# Patient Record
Sex: FEMALE | Race: WHITE | Employment: OTHER | URBAN - NONMETROPOLITAN AREA
[De-identification: names, ages, dates, MRNs, and addresses within clinical notes are randomized per-mention and may not be internally consistent; named-entity substitution may affect disease eponyms.]

---

## 2019-12-22 ENCOUNTER — HOSPITAL ENCOUNTER (EMERGENCY)
Facility: HOSPITAL | Age: 84
Discharge: HOME | End: 2019-12-22
Attending: EMERGENCY MEDICINE | Admitting: EMERGENCY MEDICINE
Payer: MEDICARE

## 2019-12-22 ENCOUNTER — APPOINTMENT (OUTPATIENT)
Dept: CT IMAGING | Facility: HOSPITAL | Age: 84
End: 2019-12-22
Attending: EMERGENCY MEDICINE
Payer: MEDICARE

## 2019-12-22 VITALS
RESPIRATION RATE: 16 BRPM | OXYGEN SATURATION: 99 % | DIASTOLIC BLOOD PRESSURE: 92 MMHG | TEMPERATURE: 98.2 F | HEART RATE: 74 BPM | SYSTOLIC BLOOD PRESSURE: 141 MMHG

## 2019-12-22 DIAGNOSIS — R10.84 ABDOMINAL PAIN, GENERALIZED: Primary | ICD-10-CM

## 2019-12-22 DIAGNOSIS — K86.9 PANCREATIC LESION: ICD-10-CM

## 2019-12-22 DIAGNOSIS — K29.70 GASTRITIS WITHOUT BLEEDING, UNSPECIFIED CHRONICITY, UNSPECIFIED GASTRITIS TYPE: ICD-10-CM

## 2019-12-22 LAB
ALBUMIN SERPL-MCNC: 3.7 G/DL (ref 3.4–5)
ALP SERPL-CCNC: 53 U/L (ref 40–150)
ALT SERPL W P-5'-P-CCNC: 20 U/L (ref 0–50)
ANION GAP SERPL CALCULATED.3IONS-SCNC: 4 MMOL/L (ref 3–14)
AST SERPL W P-5'-P-CCNC: 14 U/L (ref 0–45)
BASOPHILS # BLD AUTO: 0 10E9/L (ref 0–0.2)
BASOPHILS NFR BLD AUTO: 0.5 %
BILIRUB SERPL-MCNC: 1.2 MG/DL (ref 0.2–1.3)
BUN SERPL-MCNC: 28 MG/DL (ref 7–30)
CALCIUM SERPL-MCNC: 9.4 MG/DL (ref 8.5–10.1)
CHLORIDE SERPL-SCNC: 101 MMOL/L (ref 94–109)
CO2 SERPL-SCNC: 32 MMOL/L (ref 20–32)
CREAT SERPL-MCNC: 1.09 MG/DL (ref 0.52–1.04)
DIFFERENTIAL METHOD BLD: NORMAL
EOSINOPHIL # BLD AUTO: 0.1 10E9/L (ref 0–0.7)
EOSINOPHIL NFR BLD AUTO: 2.4 %
ERYTHROCYTE [DISTWIDTH] IN BLOOD BY AUTOMATED COUNT: 12.5 % (ref 10–15)
ERYTHROCYTE [SEDIMENTATION RATE] IN BLOOD BY WESTERGREN METHOD: 11 MM/H (ref 0–30)
GFR SERPL CREATININE-BSD FRML MDRD: 46 ML/MIN/{1.73_M2}
GLUCOSE SERPL-MCNC: 114 MG/DL (ref 70–99)
HCT VFR BLD AUTO: 38.5 % (ref 35–47)
HGB BLD-MCNC: 13.2 G/DL (ref 11.7–15.7)
IMM GRANULOCYTES # BLD: 0 10E9/L (ref 0–0.4)
IMM GRANULOCYTES NFR BLD: 0.2 %
LYMPHOCYTES # BLD AUTO: 2.9 10E9/L (ref 0.8–5.3)
LYMPHOCYTES NFR BLD AUTO: 53.3 %
MCH RBC QN AUTO: 32.5 PG (ref 26.5–33)
MCHC RBC AUTO-ENTMCNC: 34.3 G/DL (ref 31.5–36.5)
MCV RBC AUTO: 95 FL (ref 78–100)
MONOCYTES # BLD AUTO: 0.7 10E9/L (ref 0–1.3)
MONOCYTES NFR BLD AUTO: 12.6 %
NEUTROPHILS # BLD AUTO: 1.7 10E9/L (ref 1.6–8.3)
NEUTROPHILS NFR BLD AUTO: 31 %
NRBC # BLD AUTO: 0 10*3/UL
NRBC BLD AUTO-RTO: 0 /100
PLATELET # BLD AUTO: 195 10E9/L (ref 150–450)
POTASSIUM SERPL-SCNC: 3.9 MMOL/L (ref 3.4–5.3)
PROT SERPL-MCNC: 7 G/DL (ref 6.8–8.8)
RBC # BLD AUTO: 4.06 10E12/L (ref 3.8–5.2)
SODIUM SERPL-SCNC: 137 MMOL/L (ref 133–144)
WBC # BLD AUTO: 5.5 10E9/L (ref 4–11)

## 2019-12-22 PROCEDURE — 85652 RBC SED RATE AUTOMATED: CPT | Performed by: EMERGENCY MEDICINE

## 2019-12-22 PROCEDURE — 80053 COMPREHEN METABOLIC PANEL: CPT | Performed by: EMERGENCY MEDICINE

## 2019-12-22 PROCEDURE — 85025 COMPLETE CBC W/AUTO DIFF WBC: CPT | Performed by: EMERGENCY MEDICINE

## 2019-12-22 PROCEDURE — 74177 CT ABD & PELVIS W/CONTRAST: CPT | Mod: TC

## 2019-12-22 PROCEDURE — 25500064 ZZH RX 255 OP 636: Performed by: EMERGENCY MEDICINE

## 2019-12-22 PROCEDURE — 99285 EMERGENCY DEPT VISIT HI MDM: CPT | Mod: 25

## 2019-12-22 PROCEDURE — 36415 COLL VENOUS BLD VENIPUNCTURE: CPT | Performed by: EMERGENCY MEDICINE

## 2019-12-22 PROCEDURE — 99285 EMERGENCY DEPT VISIT HI MDM: CPT | Mod: Z6 | Performed by: EMERGENCY MEDICINE

## 2019-12-22 RX ORDER — ENALAPRIL MALEATE 20 MG/1
TABLET ORAL
COMMUNITY
Start: 2019-10-18

## 2019-12-22 RX ORDER — CALCIUM POLYCARBOPHIL 625 MG
1250 TABLET ORAL
COMMUNITY
Start: 2019-11-26

## 2019-12-22 RX ORDER — GABAPENTIN 100 MG/1
100 CAPSULE ORAL 3 TIMES DAILY
COMMUNITY

## 2019-12-22 RX ORDER — POTASSIUM CHLORIDE 750 MG/1
TABLET, EXTENDED RELEASE ORAL
COMMUNITY
Start: 2017-11-05

## 2019-12-22 RX ORDER — OXYCODONE HYDROCHLORIDE 5 MG/1
5 TABLET ORAL
COMMUNITY
Start: 2019-06-25

## 2019-12-22 RX ORDER — ACETAMINOPHEN 325 MG/1
650 TABLET ORAL
COMMUNITY
Start: 2019-06-25

## 2019-12-22 RX ORDER — AMPICILLIN TRIHYDRATE 500 MG
1000 CAPSULE ORAL
COMMUNITY
Start: 2011-02-22

## 2019-12-22 RX ORDER — LEVOTHYROXINE SODIUM 75 UG/1
TABLET ORAL
COMMUNITY
Start: 2018-11-04

## 2019-12-22 RX ORDER — HYDROCHLOROTHIAZIDE 12.5 MG/1
CAPSULE ORAL
COMMUNITY
Start: 2019-07-13

## 2019-12-22 RX ORDER — SOTALOL HYDROCHLORIDE 80 MG/1
40 TABLET ORAL
COMMUNITY
Start: 2019-08-16

## 2019-12-22 RX ORDER — AMLODIPINE BESYLATE 5 MG/1
5 TABLET ORAL
COMMUNITY
Start: 2019-11-29

## 2019-12-22 RX ORDER — IOPAMIDOL 612 MG/ML
100 INJECTION, SOLUTION INTRAVASCULAR ONCE
Status: COMPLETED | OUTPATIENT
Start: 2019-12-22 | End: 2019-12-22

## 2019-12-22 RX ORDER — OMEGA-3 FATTY ACIDS/FISH OIL 300-1000MG
400 CAPSULE ORAL
COMMUNITY
Start: 2019-06-25

## 2019-12-22 RX ORDER — OMEPRAZOLE 40 MG/1
40 CAPSULE, DELAYED RELEASE ORAL DAILY
Qty: 30 CAPSULE | Refills: 0 | Status: SHIPPED | OUTPATIENT
Start: 2019-12-22 | End: 2020-01-21

## 2019-12-22 RX ORDER — PANTOPRAZOLE SODIUM 40 MG/1
40 TABLET, DELAYED RELEASE ORAL DAILY
COMMUNITY

## 2019-12-22 RX ADMIN — IOPAMIDOL 100 ML: 612 INJECTION, SOLUTION INTRAVENOUS at 19:08

## 2019-12-22 RX ADMIN — DIATRIZOATE MEGLUMINE AND DIATRIZOATE SODIUM 1 ML: 660; 100 SOLUTION ORAL; RECTAL at 19:08

## 2019-12-22 SDOH — HEALTH STABILITY: MENTAL HEALTH: HOW OFTEN DO YOU HAVE A DRINK CONTAINING ALCOHOL?: NEVER

## 2019-12-22 ASSESSMENT — ENCOUNTER SYMPTOMS
DIFFICULTY URINATING: 0
ABDOMINAL PAIN: 1
EYE REDNESS: 0
SHORTNESS OF BREATH: 0
ARTHRALGIAS: 0
HEADACHES: 0
FEVER: 0
NECK STIFFNESS: 0
CONFUSION: 0
COLOR CHANGE: 0

## 2019-12-22 NOTE — ED AVS SNAPSHOT
[facility]  [address] [address]  Phone:  [phone]                                    Rosita Brooke   MRN: [id number]    Department:  [facility]   Date of Visit:  12/22/2019           After Visit Summary Signature Page    I have received my discharge instructions, and my questions have been answered. I have discussed any challenges I see with this plan with the nurse or doctor.    ..........................................................................................................................................  Patient/Patient Representative Signature      ..........................................................................................................................................  Patient Representative Print Name and Relationship to Patient    ..................................................               ................................................  Date                                   Time    ..........................................................................................................................................  Reviewed by Signature/Title    ...................................................              ..............................................  Date                                               Time          22EPIC Rev 08/18

## 2019-12-22 NOTE — ED PROVIDER NOTES
"  History     Chief Complaint   Patient presents with     Abdominal Pain     HPI  Rosita Brooke is a 85 year old female who presented to the emergency department accompanied by the spouse and 2 sons.  Patient has been feeling unwell for more than 6 months.  Has been seen at the emergency department at [facility] multiple times without a definite diagnosis.  Patient describes her symptoms as generalized abdominal pain that is described described as burning sensation.  Does not radiate.  Is especially worse at night when patient is lying down and seems to be better in the course of the day.  She has had upper and lower GI scopes, CT scans of the abdomen and multiple laboratory test done without any definite diagnosis.  She takes hydrocodone and ibuprofen for pain which sometimes help but is very concerned because they want to know what exactly is causing her pain.  Review of patient's chart shows that patient had a CT scan done on November 26 which ruled out acute diverticulitis.  She has lost at least 10 pounds in the last 6 months.  Denies night sweats, diarrhea, vomiting, constipation.  The patient's 2 sons also are requesting that patient be admitted to the hospital \"so that she can figure out what is exactly wrong with her\".    Allergies:  Allergies   Allergen Reactions     Atenolol Nausea     Metoprolol      Insomnia, hallucinations        Problem List:    There are no active problems to display for this patient.       Past Medical History:    History reviewed. No pertinent past medical history.    Past Surgical History:    History reviewed. No pertinent surgical history.    Family History:    History reviewed. No pertinent family history.    Social History:  Marital Status:   [2]  Social History     Tobacco Use     Smoking status: Former Smoker     Smokeless tobacco: Never Used   Substance Use Topics     Alcohol use: Never     Frequency: Never     Drug use: Never        Medications:  "   acetaminophen (TYLENOL) 325 MG tablet  amLODIPine (NORVASC) 5 MG tablet  Calcium Polycarbophil (FIBER) 625 MG tablet  Cholecalciferol (D 1000) 25 MCG (1000 UT) CAPS  enalapril (VASOTEC) 20 MG tablet  gabapentin (NEURONTIN) 100 MG capsule  hydrochlorothiazide (MICROZIDE) 12.5 MG capsule  ibuprofen (ADVIL/MOTRIN) 200 MG capsule  levothyroxine (SYNTHROID/LEVOTHROID) 75 MCG tablet  omeprazole (PRILOSEC) 40 MG DR capsule  oxyCODONE (ROXICODONE) 5 MG tablet  pantoprazole (PROTONIX) 40 MG EC tablet  potassium chloride ER (K-TAB/KLOR-CON) 10 MEQ CR tablet  sotalol (BETAPACE) 80 MG tablet          Review of Systems   Constitutional: Negative for fever.   HENT: Negative for congestion.    Eyes: Negative for redness.   Respiratory: Negative for shortness of breath.    Cardiovascular: Negative for chest pain.   Gastrointestinal: Positive for abdominal pain.   Genitourinary: Negative for difficulty urinating.   Musculoskeletal: Negative for arthralgias and neck stiffness.   Skin: Negative for color change.   Neurological: Negative for headaches.   Psychiatric/Behavioral: Negative for confusion.   All other systems reviewed and are negative.      Physical Exam   BP: 139/70  Pulse: 65  Temp: 96.9  F (36.1  C)  Resp: 16  SpO2: 99 %      Physical Exam  Vitals signs and nursing note reviewed.   Constitutional:       General: She is not in acute distress.     Appearance: She is well-developed. She is not diaphoretic.   HENT:      Head: Normocephalic and atraumatic.      Mouth/Throat:      Pharynx: No oropharyngeal exudate.   Eyes:      General: No scleral icterus.     Pupils: Pupils are equal, round, and reactive to light.   Cardiovascular:      Rate and Rhythm: Normal rate and regular rhythm.      Heart sounds: Normal heart sounds.   Pulmonary:      Effort: No respiratory distress.      Breath sounds: Normal breath sounds.   Chest:      Chest wall: No tenderness.   Abdominal:      General: Bowel sounds are normal.      Palpations:  Abdomen is soft.      Tenderness: There is abdominal tenderness in the right lower quadrant, suprapubic area and left lower quadrant.   Musculoskeletal:         General: No tenderness.   Skin:     General: Skin is warm.      Findings: No rash.   Neurological:      Mental Status: She is alert and oriented to person, place, and time.         ED Course   Patient evaluated upon arrival in the ED and laboratory tests ordered including CT abdomen.      Procedures      Results for orders placed or performed during the hospital encounter of 12/22/19 (from the past 24 hour(s))   CBC with platelets differential   Result Value Ref Range    WBC 5.5 4.0 - 11.0 10e9/L    RBC Count 4.06 3.8 - 5.2 10e12/L    Hemoglobin 13.2 11.7 - 15.7 g/dL    Hematocrit 38.5 35.0 - 47.0 %    MCV 95 78 - 100 fl    MCH 32.5 26.5 - 33.0 pg    MCHC 34.3 31.5 - 36.5 g/dL    RDW 12.5 10.0 - 15.0 %    Platelet Count 195 150 - 450 10e9/L    Diff Method Automated Method     % Neutrophils 31.0 %    % Lymphocytes 53.3 %    % Monocytes 12.6 %    % Eosinophils 2.4 %    % Basophils 0.5 %    % Immature Granulocytes 0.2 %    Nucleated RBCs 0 0 /100    Absolute Neutrophil 1.7 1.6 - 8.3 10e9/L    Absolute Lymphocytes 2.9 0.8 - 5.3 10e9/L    Absolute Monocytes 0.7 0.0 - 1.3 10e9/L    Absolute Eosinophils 0.1 0.0 - 0.7 10e9/L    Absolute Basophils 0.0 0.0 - 0.2 10e9/L    Abs Immature Granulocytes 0.0 0 - 0.4 10e9/L    Absolute Nucleated RBC 0.0    Comprehensive metabolic panel   Result Value Ref Range    Sodium 137 133 - 144 mmol/L    Potassium 3.9 3.4 - 5.3 mmol/L    Chloride 101 94 - 109 mmol/L    Carbon Dioxide 32 20 - 32 mmol/L    Anion Gap 4 3 - 14 mmol/L    Glucose 114 (H) 70 - 99 mg/dL    Urea Nitrogen 28 7 - 30 mg/dL    Creatinine 1.09 (H) 0.52 - 1.04 mg/dL    GFR Estimate 46 (L) >60 mL/min/[1.73_m2]    GFR Estimate If Black 53 (L) >60 mL/min/[1.73_m2]    Calcium 9.4 8.5 - 10.1 mg/dL    Bilirubin Total 1.2 0.2 - 1.3 mg/dL    Albumin 3.7 3.4 - 5.0 g/dL     Protein Total 7.0 6.8 - 8.8 g/dL    Alkaline Phosphatase 53 40 - 150 U/L    ALT 20 0 - 50 U/L    AST 14 0 - 45 U/L   Erythrocyte sedimentation rate auto   Result Value Ref Range    Sed Rate 11 0 - 30 mm/h   CT Abdomen Pelvis w Contrast    Narrative    PROCEDURE:  CT ABDOMEN PELVIS W CONTRAST    HISTORY: Abd pain, acute, generalized; Generalized burning abdominal  pain that has been recurrent for several months.    TECHNIQUE:  Helical CT of the abdomen and pelvis was performed  following injection of intravenous contrast.  Ingested oral contrast  partially opacifies the bowel.      COMPARISON:  None.    MEDS/CONTRAST: Isovue 300 100 Ml    FINDINGS:      Limited images through the lung bases demonstrate dependent  atelectasis or scarring. A small sliding hiatal hernia is questioned.    Multiple hepatic cysts are present. There is an 11 mm pancreatic head  cyst like lesion. There is thickening of the left adrenal gland.  Spleen is unremarkable. Symmetric nephrograms are present without  hydronephrosis. There is no abdominal aortic aneurysm. The bowel is  normal in caliber. Prominent stool in the colon can be seen in  constipation.    No free fluid, free air or adenopathy is present.  No suspicious  osseous lesions are identified.      Impression    IMPRESSION:      Small sliding hiatal hernia.    11 mm cystic pancreatic lesion. Left adrenal adenoma. Consider  follow-up nonemergent MR abdomen.    XAVI THORNTON MD       Medications   diatrizoate meglumine-sodium (GASTROGRAFIN/GASTROVIEW) 66-10 % solution 30 mL (1 mL Oral Given 12/22/19 1908)   iopamidol (ISOVUE-300) IV solution 61% 100 mL (100 mLs Intravenous Given 12/22/19 1908)   sodium chloride (PF) 0.9% PF flush 60 mL (50 mLs Intravenous Given 12/22/19 1908)       Assessments & Plan (with Medical Decision Making)   Recurrent abdominal pain: Recurrent generalized abdominal pain for more than 6 months.  CT abdomen showed pancreatic lesion and recommended MRI for  further evaluation.  Review of chart also shows that EGD done 5 months ago showed some gastritis and patient is not on treatment for this.  Patient started on omeprazole.  Advised on follow-up for further evaluation of the pancreatic lesion including abdominal MRI.  Discharged home in stable condition.    I have reviewed the nursing notes.    I have reviewed the findings, diagnosis, plan and need for follow up with the patient.    Discharge Medication List as of 12/22/2019  8:25 PM      START taking these medications    Details   omeprazole (PRILOSEC) 40 MG DR capsule Take 1 capsule (40 mg) by mouth daily, Disp-30 capsule, R-0, E-Prescribe             Final diagnoses:   Abdominal pain, generalized   Pancreatic lesion   Gastritis without bleeding, unspecified chronicity, unspecified gastritis type       12/22/2019   [facility]     Cheo Olivier MD  12/23/19 [id number]

## 2019-12-23 ENCOUNTER — APPOINTMENT (OUTPATIENT)
Dept: MRI IMAGING | Facility: HOSPITAL | Age: 84
End: 2019-12-23
Attending: EMERGENCY MEDICINE
Payer: MEDICARE

## 2019-12-23 ENCOUNTER — HOSPITAL ENCOUNTER (EMERGENCY)
Facility: HOSPITAL | Age: 84
Discharge: HOME | End: 2019-12-23
Attending: EMERGENCY MEDICINE | Admitting: EMERGENCY MEDICINE
Payer: MEDICARE

## 2019-12-23 VITALS
WEIGHT: 128 LBS | HEART RATE: 66 BPM | SYSTOLIC BLOOD PRESSURE: 120 MMHG | TEMPERATURE: 97.5 F | DIASTOLIC BLOOD PRESSURE: 78 MMHG | OXYGEN SATURATION: 95 % | RESPIRATION RATE: 16 BRPM

## 2019-12-23 DIAGNOSIS — R10.9 CHRONIC ABDOMINAL PAIN: Primary | ICD-10-CM

## 2019-12-23 DIAGNOSIS — G89.29 CHRONIC ABDOMINAL PAIN: Primary | ICD-10-CM

## 2019-12-23 DIAGNOSIS — K86.9 PANCREATIC LESION: ICD-10-CM

## 2019-12-23 PROCEDURE — 25000128 H RX IP 250 OP 636: Performed by: EMERGENCY MEDICINE

## 2019-12-23 PROCEDURE — 25500064 ZZH RX 255 OP 636: Performed by: RADIOLOGY

## 2019-12-23 PROCEDURE — 99284 EMERGENCY DEPT VISIT MOD MDM: CPT | Mod: Z6 | Performed by: EMERGENCY MEDICINE

## 2019-12-23 PROCEDURE — 74183 MRI ABD W/O CNTR FLWD CNTR: CPT | Mod: TC

## 2019-12-23 PROCEDURE — 25000132 ZZH RX MED GY IP 250 OP 250 PS 637: Mod: GY | Performed by: EMERGENCY MEDICINE

## 2019-12-23 PROCEDURE — 25000125 ZZHC RX 250: Performed by: EMERGENCY MEDICINE

## 2019-12-23 PROCEDURE — A9585 GADOBUTROL INJECTION: HCPCS | Performed by: RADIOLOGY

## 2019-12-23 PROCEDURE — 99284 EMERGENCY DEPT VISIT MOD MDM: CPT | Mod: 25

## 2019-12-23 RX ORDER — ONDANSETRON 4 MG/1
4 TABLET, ORALLY DISINTEGRATING ORAL ONCE
Status: COMPLETED | OUTPATIENT
Start: 2019-12-23 | End: 2019-12-23

## 2019-12-23 RX ORDER — GADOBUTROL 604.72 MG/ML
7.5 INJECTION INTRAVENOUS ONCE
Status: COMPLETED | OUTPATIENT
Start: 2019-12-23 | End: 2019-12-23

## 2019-12-23 RX ADMIN — LIDOCAINE HYDROCHLORIDE 30 ML: 20 SOLUTION ORAL; TOPICAL at 10:11

## 2019-12-23 RX ADMIN — GADOBUTROL 7.5 ML: 604.72 INJECTION INTRAVENOUS at 11:57

## 2019-12-23 RX ADMIN — ONDANSETRON 4 MG: 4 TABLET, ORALLY DISINTEGRATING ORAL at 10:11

## 2019-12-23 ASSESSMENT — ENCOUNTER SYMPTOMS
FEVER: 0
SHORTNESS OF BREATH: 0
ABDOMINAL PAIN: 1

## 2019-12-23 NOTE — ED NOTES
Spoke with L[facility] at [facility] and they will be here around 11:15 -11:30 to get patient.     Patient and spouse updated. Pt up to the bathroom, steady on feet

## 2019-12-23 NOTE — ED AVS SNAPSHOT
[facility]  [address] [address]  Phone:  [phone]                                    Rosita Brooke   MRN: [id number]    Department:  [facility]   Date of Visit:  12/23/2019           After Visit Summary Signature Page    I have received my discharge instructions, and my questions have been answered. I have discussed any challenges I see with this plan with the nurse or doctor.    ..........................................................................................................................................  Patient/Patient Representative Signature      ..........................................................................................................................................  Patient Representative Print Name and Relationship to Patient    ..................................................               ................................................  Date                                   Time    ..........................................................................................................................................  Reviewed by Signature/Title    ...................................................              ..............................................  Date                                               Time          22EPIC Rev 08/18

## 2019-12-23 NOTE — ED NOTES
Patient here with [facility] for MRI. Pt was seen yesterday for abdominal pains and needed further imagining. Pt slightly nauseated and having pain, pt did take a pain pill around 0500 and it helped slightly

## 2019-12-23 NOTE — ED NOTES
Discharge instructions reviewed with patient. Rx has been sent to pharmacy of choice.  No questions or concerns. Copy of AVS in hand on discharge.  Encouraged to return with new or worsening symptoms.

## 2019-12-23 NOTE — ED PROVIDER NOTES
"  History     Chief Complaint   Patient presents with     Abdominal Pain     \"In ER last night, needs additional testing\"      Back Pain     \"In ER last night, needs additional testing\"      HPI  Rosita Brooke is a 85 year old female who presents to the emergency department for follow-up after being seen here last night for chronic abdominal pain.  She is here for MRI to further evaluate 11 mm pancreatic head.  She has had abdominal pain for more than 6 months and was evaluated last night with laboratory tests and CT scan of the abdomen revealing pancreatic lesion.  Radiologist recommended further delineation of the pancreatic lesion with MRI.  Patient is complaining of mild epigastric discomfort upon arrival at the ED but no chest pain, shortness of breath, fever or chills.    Allergies:  Allergies   Allergen Reactions     Atenolol Nausea     Metoprolol      Insomnia, hallucinations        Problem List:    There are no active problems to display for this patient.       Past Medical History:    No past medical history on file.    Past Surgical History:    No past surgical history on file.    Family History:    No family history on file.    Social History:  Marital Status:   [2]  Social History     Tobacco Use     Smoking status: Former Smoker     Smokeless tobacco: Never Used   Substance Use Topics     Alcohol use: Never     Frequency: Never     Drug use: Never        Medications:    ranitidine (ZANTAC) 300 MG tablet  acetaminophen (TYLENOL) 325 MG tablet  amLODIPine (NORVASC) 5 MG tablet  Calcium Polycarbophil (FIBER) 625 MG tablet  Cholecalciferol (D 1000) 25 MCG (1000 UT) CAPS  enalapril (VASOTEC) 20 MG tablet  gabapentin (NEURONTIN) 100 MG capsule  hydrochlorothiazide (MICROZIDE) 12.5 MG capsule  ibuprofen (ADVIL/MOTRIN) 200 MG capsule  levothyroxine (SYNTHROID/LEVOTHROID) 75 MCG tablet  omeprazole (PRILOSEC) 40 MG DR capsule  oxyCODONE (ROXICODONE) 5 MG tablet  pantoprazole (PROTONIX) 40 MG EC " tablet  potassium chloride ER (K-TAB/KLOR-CON) 10 MEQ CR tablet  sotalol (BETAPACE) 80 MG tablet          Review of Systems   Constitutional: Negative for fever.   Respiratory: Negative for shortness of breath.    Cardiovascular: Negative for chest pain.   Gastrointestinal: Positive for abdominal pain.   All other systems reviewed and are negative.      Physical Exam   BP: 128/69  Pulse: 62  Heart Rate: 66  Temp: 97.9  F (36.6  C)  Resp: 18  Weight: 58.1 kg (128 lb)  SpO2: 99 %      Physical Exam  Vitals signs and nursing note reviewed.   Constitutional:       General: She is not in acute distress.     Appearance: She is well-developed. She is not diaphoretic.   HENT:      Head: Atraumatic.      Mouth/Throat:      Pharynx: No oropharyngeal exudate.   Eyes:      General: No scleral icterus.     Pupils: Pupils are equal, round, and reactive to light.   Cardiovascular:      Rate and Rhythm: Regular rhythm.      Heart sounds: Normal heart sounds.   Pulmonary:      Effort: No respiratory distress.      Breath sounds: Normal breath sounds.   Chest:      Chest wall: No tenderness.   Abdominal:      General: Bowel sounds are normal.      Palpations: Abdomen is soft.      Tenderness: There is no abdominal tenderness.   Musculoskeletal:         General: No tenderness.   Skin:     General: Skin is warm.      Findings: No rash.   Neurological:      Mental Status: She is alert and oriented to person, place, and time.         ED Course   Evaluated upon arrival and MRI ordered.    Procedures      Results for orders placed or performed during the hospital encounter of 12/23/19 (from the past 24 hour(s))   MR Abdomen w/o & w Contrast    Narrative    PROCEDURE: MR ABDOMEN W/O & W CONTRAST 12/23/2019 12:14 PM    HISTORY: Pancreatic lesion    COMPARISONS: CT dated 12/22/2019.    Meds/Dose Given: Gadavist 7   mL    TECHNIQUE: Axial and coronal images with a combination of T1, dynamic  postcontrast enhanced T1, T2, diffusion and in and  out of phase  images.    FINDINGS: There is motion artifact on several of the sequences which  does cause some limitation in evaluation.    There are multiple benign-appearing hepatic cysts. No solid hepatic  mass is seen. No abnormality is seen in the spleen, adrenal glands or  kidneys.    10 mm cyst is again seen in the pancreatic head. No appreciable  enhancement or solid component is seen. There is no associated ductal  dilatation.    Gallbladder is present. No definite stones are seen. There is no  significant biliary dilatation.         Impression    IMPRESSION:   1. Small nonspecific but benign-appearing cyst in the pancreatic head.  This is not a typical pancreatic head adenocarcinoma.  2. Multiple benign-appearing hepatic cysts.    SHAKIR FITZGERALD MD       Medications   lidocaine (XYLOCAINE) 2 % 15 mL, alum & mag hydroxide-simethicone (MYLANTA ES/MAALOX  ES) 15 mL GI Cocktail (30 mLs Oral Given 12/23/19 1011)   ondansetron (ZOFRAN-ODT) ODT tab 4 mg (4 mg Oral Given 12/23/19 1011)   gadobutrol (GADAVIST) injection 7.5 mL (7.5 mLs Intravenous Given 12/23/19 1157)   sodium chloride (PF) 0.9% PF flush 20 mL (20 mLs Intravenous Given 12/23/19 1157)   sodium chloride (PF) 0.9% PF flush 20 mL (20 mLs Intravenous Given 12/23/19 1157)       Assessments & Plan (with Medical Decision Making)   Chronic abdominal pain:  Pancreatic lesion:  Gastritis:  Presented to the ED for further evaluation of pancreatic lesion.  MRI of abdomen and pelvis showed cystic lesion of the head of pancreas most likely benign.  It also showed multiple cysts in the liver probably benign.  Results discussed with patient and the spouse.  Referral to gastroenterologist made.  Meanwhile patient started on Zantac every day for prior diagnosis of gastritis on upper GI scope done 5 months ago.  Patient and spouse were in agreement with treatment plan and was discharged home in stable condition.  I have reviewed the nursing notes.    I have  reviewed the findings, diagnosis, plan and need for follow up with the patient.    Discharge Medication List as of 12/23/2019 12:50 PM      START taking these medications    Details   ranitidine (ZANTAC) 300 MG tablet Take 1 tablet (300 mg) by mouth At Bedtime, Disp-90 tablet, R-4, E-Prescribe             Final diagnoses:   Chronic abdominal pain   Pancreatic lesion       12/23/2019   [facility]     Cheo Olivier MD  12/23/19 [id number]

## 2019-12-23 NOTE — ED NOTES
Patient and  given written and verbal discharge instructions and both verbalize understanding. Patient left ambulatory with plan to return to ED tomorrow for MRI per Dr. Olivier who will see patient again.

## 2019-12-23 NOTE — ED NOTES
Face to face report given with opportunity to observe patient.    Report given to Pauline TEAGUE RN   12/22/2019  7:09 PM